# Patient Record
Sex: FEMALE | Race: WHITE
[De-identification: names, ages, dates, MRNs, and addresses within clinical notes are randomized per-mention and may not be internally consistent; named-entity substitution may affect disease eponyms.]

---

## 2017-03-24 ENCOUNTER — HOSPITAL ENCOUNTER (OUTPATIENT)
Dept: HOSPITAL 38 - CC.SDS | Age: 82
End: 2017-03-24
Attending: FAMILY MEDICINE
Payer: MEDICARE

## 2017-03-24 VITALS — SYSTOLIC BLOOD PRESSURE: 131 MMHG | DIASTOLIC BLOOD PRESSURE: 60 MMHG

## 2017-03-24 DIAGNOSIS — E78.00: ICD-10-CM

## 2017-03-24 DIAGNOSIS — K21.9: ICD-10-CM

## 2017-03-24 DIAGNOSIS — Z90.710: ICD-10-CM

## 2017-03-24 DIAGNOSIS — Z72.0: ICD-10-CM

## 2017-03-24 DIAGNOSIS — Z79.01: ICD-10-CM

## 2017-03-24 DIAGNOSIS — Z12.11: Primary | ICD-10-CM

## 2017-03-24 DIAGNOSIS — Z88.8: ICD-10-CM

## 2017-03-24 DIAGNOSIS — E55.9: ICD-10-CM

## 2017-03-24 DIAGNOSIS — I10: ICD-10-CM

## 2017-03-24 DIAGNOSIS — Z79.82: ICD-10-CM

## 2017-03-24 DIAGNOSIS — Z96.649: ICD-10-CM

## 2017-03-24 DIAGNOSIS — Z98.890: ICD-10-CM

## 2017-03-24 DIAGNOSIS — Z90.49: ICD-10-CM

## 2017-03-24 DIAGNOSIS — E03.9: ICD-10-CM

## 2017-03-24 DIAGNOSIS — Z79.899: ICD-10-CM

## 2017-03-24 PROCEDURE — G0105 COLORECTAL SCRN; HI RISK IND: HCPCS

## 2017-03-24 PROCEDURE — 36415 COLL VENOUS BLD VENIPUNCTURE: CPT

## 2017-03-24 PROCEDURE — 85610 PROTHROMBIN TIME: CPT

## 2017-03-24 NOTE — OR
DATE OF OPERATION:  03/24/2017

 

PREOPERATIVE DIAGNOSIS:  FOLLOWUP POLYPS.

 

POSTOPERATIVE DIAGNOSIS:  FOLLOWUP POLYPS.

 

SURGEON:  Michel Chan MD

 

PROCEDURE:  FULL-LENGTH COLONOSCOPY.

 

ANESTHESIA:  CRNA due to advanced age.

 

COMPLICATIONS:  None.

 

SPECIMEN:  None.

 

FINDINGS:  Normal full length colonoscopy with marginal prep.

 

RECOMMENDATIONS:  No further routine scopes needed.

 

INDICATIONS:  The patient was scheduled by her primary physician, Dr. Chacorta Mcnally

for followup on prior scope with polyp removal 5 years ago.

 

DESCRIPTION OF PROCEDURE:  The patient was prepped and draped, placed in the

left lateral decubitus position.  A lubricated Olympus colonoscope was inserted

and easily advanced to the cecum.  Direct visualization of the ileocecal valve

and appendiceal orifice was accomplished.  The bowel prep was marginal.  There

was a lot of liquid stool throughout most of the colon, but visualization was

for the most part adequate.  Upon withdrawal throughout the entire length of the

colon, I could find no signs of polyps, mass, ulceration, or bleeding sites.  No

vascular abnormalities or significant diverticular disease.  Rectal vault

appeared benign.

Retroflexion of the scope in the rectum showed no anal lesions.  Air was then

suctioned.  The scope was removed without complication.

 

TERESA/UMESH

DD:  03/24/2017 09:35:34

DT:  03/24/2017 11:24:56

Job #:  [id number]

## 2018-06-01 ENCOUNTER — HOSPITAL ENCOUNTER (EMERGENCY)
Dept: HOSPITAL 38 - CC.ED | Age: 83
Discharge: HOME | End: 2018-06-01
Payer: MEDICARE

## 2018-06-01 VITALS — SYSTOLIC BLOOD PRESSURE: 134 MMHG | DIASTOLIC BLOOD PRESSURE: 85 MMHG

## 2018-06-01 DIAGNOSIS — Z88.6: ICD-10-CM

## 2018-06-01 DIAGNOSIS — Z88.1: ICD-10-CM

## 2018-06-01 DIAGNOSIS — Z88.2: ICD-10-CM
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
## 2018-06-01 DIAGNOSIS — Z79.82: ICD-10-CM

## 2018-06-01 DIAGNOSIS — Z79.899: ICD-10-CM

## 2018-06-01 DIAGNOSIS — S01.01XA: Primary | ICD-10-CM

## 2018-06-01 NOTE — EDM.PDOC
ED HPI GENERAL MEDICAL PROBLEM





- General


Chief Complaint: Head Injury


Stated Complaint: fall, head injury


Time Seen by Provider: 06/01/18 11:45


Source of Information: Reports: Patient, Family


History Limitations: Reports: No Limitations





- History of Present Illness


INITIAL COMMENTS - FREE TEXT/NARRATIVE: 





Thais is a pleasant 88 year old female who presents to the ED via private 

vehicle with c/o a fall and laceration to her posterior scalp. She reports she 

was going up the steps and missed a step. She reports she fell backwards and 

her head hit a brick. She denies LOC. She reports her scalp started bleeding 

immediately, so she came to the ED. She reports a slight headache and some 

dizziness. Does have a large hematoma to her posterior head. She is on 

Coumadin. She reports she recently restarted it after being off of it for a 

proedure. She reports she has some right hip pain, but nothing worse than her 

baseline. She is able to ambulate without difficulty. Does not offer any 

additional complaints. 


Onset: Today, Sudden


Onset Date: 06/01/18


Onset Time: 11:00


Location: Reports: Head


Quality: Reports: Ache


Improves with: Reports: Cold Therapy, Other (pressure)


Associated Symptoms: Reports: Headaches.  Denies: Confusion, Chest Pain, Cough, 

cough w sputum, Diaphoresis, Fever/Chills, Loss of Appetite, Malaise, Nausea/

Vomiting, Rash, Shortness of Breath, Syncope, Weakness





- Related Data


 Allergies











Allergy/AdvReac Type Severity Reaction Status Date / Time


 


acetaminophen Allergy Intermediate Hallucinati Verified 06/01/18 13:23





[From Darvocet-N]   ons  


 


sulfamethoxazole Allergy Intermediate Cannot Verified 06/01/18 13:23





[From Bactrim]   Remember  


 


propoxyphene napsylate Allergy  Hallucinati Verified 06/01/18 13:23





[From Darvocet-N]   ons  


 


trimethoprim [From Bactrim] Allergy  Cannot Verified 06/01/18 13:23





   Remember  











Home Meds: 


 Home Meds





Aspirin [Adult Low Dose Aspirin EC] 81 mg PO BEDTIME 05/04/16 [History]


Levothyroxine 112 mcg PO ACBREAKFAST 05/04/16 [History]


Omeprazole 20 mg PO DAILY 05/04/16 [History]


Ranitidine [Zantac] 150 mg PO BEDTIME 05/04/16 [History]


Simvastatin [Zocor] 10 mg PO BEDTIME 05/04/16 [History]


Warfarin [Coumadin] 5 mg PO WE 05/04/16 [History]


amLODIPine [Norvasc] 10 mg PO DAILY 05/04/16 [History]


Biotin 5,000 mcg PO DAILY 03/22/17 [History]


Calcium Carb & Citrate/Vit D3 [Calcium + D3 ER Tablet] 1 tab PO TID 03/22/17 [

History]


Cholecalciferol (Vitamin D3) [Vitamin D3] 5,000 unit PO DAILY 03/22/17 [History]


Ferrous Sulfate [Iron] 1 tab PO DAILY 03/22/17 [History]


Furosemide 80 mg PO DAILY 03/22/17 [History]


Meclizine HCl 25 mg PO DAILY PRN 03/22/17 [History]


Multivitamin [Multi-Vitamin Daily] 1 tab PO DAILY 03/22/17 [History]


Potassium Chloride 20 meq PO DAILY 03/22/17 [History]


Triamterene/Hydrochlorothiazid [Triamterene-HCTZ 37.5-25 MG] 1 tab PO DAILY PRN 

03/22/17 [History]


Warfarin [Coumadin] 2.5 mg PO SUMOTUTHFRSA 06/01/18 [History]











Social & Family History





- Tobacco Use


Smoking Status *Q: Never Smoker





- Caffeine Use


Caffeine Use: Reports: None





ED ROS GENERAL





- Review of Systems


Review Of Systems: ROS reveals no pertinent complaints other than HPI.


Constitutional: Reports: No Symptoms


HEENT: Denies: Ear Discharge, Ear Pain, Vision Change


Respiratory: Reports: No Symptoms


Cardiovascular: Reports: No Symptoms


GI/Abdominal: Reports: No Symptoms


: Reports: No Symptoms


Musculoskeletal: Reports: Leg Pain, Joint Pain (hips)


Skin: Reports: Bruising (right leg, scalp)


Neurological: Reports: Dizziness, Headache.  Denies: Confusion, Numbness, 

Syncope, Tingling, Difficulty Walking, Weakness


Psychiatric: Reports: No Symptoms


Hematologic/Lymphatic: Reports: No Symptoms


Immunologic: Reports: No Symptoms





ED EXAM, HEAD INJURY





- Physical Exam


Exam: See Below


Exam Limited By: No Limitations


General Appearance: Alert, WD/WN, No Apparent Distress


Head: Normocephalic, Scalp Lacerations (right posterior ), Scalp Swelling (

right posterior), Scalp Hematoma (right posterior)


Eyes: Bilateral Eye: EOMI, Normal Fundi, Normal Inspection, PERRL


Ears: Normal External Exam, Normal Canal, Hearing Grossly Normal, Normal TMs


Nose: Normal Inspection, Normal Mucousa, No Blood


Throat/Mouth: Normal Inspection, Normal Lips, Normal Teeth, Normal Gums, Normal 

Oropharynx, Normal Voice, No Airway Compromise


Neck: Non-Tender, Full Range of Motion, Normal Alignment, Normal Inspection


Respiratory: No Respiratory Distress, Lungs Clear, Normal Breath Sounds, No 

Accessory Muscle Use, Chest Non-Tender


Cardiovascular: Normal Peripheral Pulses, Regular Rate, Rhythm, No Edema, No 

Gallop, No JVD, No Murmur, No Rub


Back Exam: Full Range of Motion, Normal Inspection, NT


Extremities: Normal Range of Motion, Non-Tender, Normal Capillary Refill, Leg 

Pain.  No: Joint Swelling


Neurologic: CNs II-XII nml As Tested, No Motor/Sensory Deficits, Alert, Normal 

Mood/Affect, Oriented x 3





- [place] Coma Score


Best Eye Response ([place]): (4) Open Spontaneously


Best Verbal Response ([place]): (5) Oriented


Best Motor Response ([place]): (6) Obeys Commands





Course





- Vital Signs


Last Recorded V/S: 


 Last Vital Signs











Temp  96.8 F   06/01/18 11:19


 


Pulse  86   06/01/18 11:19


 


Resp  16   06/01/18 11:19


 


BP  134/85   06/01/18 11:19


 


Pulse Ox  94 L  06/01/18 11:19














- Orders/Labs/Meds


Orders: 


 Active Orders 24 hr











 Category Date Time Status


 


 Head wo Cont [CT] Stat Exams  06/01/18 11:26 Taken











Labs: 


 Laboratory Tests











  06/01/18 Range/Units





  12:48 


 


PT  13.3 H  (9.7-12.3)  SEC


 


INR  1.30 H  (0.92-1.18)  














- Radiology Interpretation


Free Text/Narrative:: 





Negative for acute issues. 


CT Results Date: 06/01/18





- Re-Assessments/Exams


Free Text/Narrative Re-Assessment/Exam: 


Wound cleansed by nursing staff. Superficial abrasion approximately ~1.5 cm to 

right posterior scalp. Pinpoint area of bleeding. Hair shaved around abrasion. 

Steri strips applied to control bleeding. 





Departure





- Departure


Time of Disposition: 13:44


Disposition: Home, Self-Care 01


Condition: Good


Clinical Impression: 


Fall


Qualifiers:


 Encounter type: initial encounter Qualified Code(s): W19.XXXA - Unspecified 

fall, initial encounter





Scalp laceration


Qualifiers:


 Encounter type: initial encounter Qualified Code(s): S01.01XA - Laceration 

without foreign body of scalp, initial encounter








- Discharge Information


Instructions:  Facial or Scalp Contusion, Easy-to-Read, Laceration Care, Adult, 

Easy-to-Read


Referrals: 


Chacorta Mcnally MD [Primary Care Provider] - 


Forms:  ED Department Discharge


Additional Instructions: 


Head CT negative


Steri strips will gradually fall off. May shower as normal tomorrow. 


Tylenol as needed for pain


Apply ice to areas of pain


May also alternate heat as needed for comfort


Rest the next few days until stiffness improves


Follow up with Dr. Mcnally





- My Orders


Last 24 Hours: 


My Active Orders





06/01/18 11:26


Head wo Cont [CT] Stat 














- Assessment/Plan


Last 24 Hours: 


My Active Orders





06/01/18 11:26


Head wo Cont [CT] Stat

## 2018-10-03 ENCOUNTER — HOSPITAL ENCOUNTER (OUTPATIENT)
Dept: HOSPITAL 38 - CC.MS | Age: 83
Setting detail: OBSERVATION
LOS: 2 days | Discharge: HOME | End: 2018-10-05
Attending: FAMILY MEDICINE | Admitting: NURSE PRACTITIONER
Payer: MEDICARE

## 2018-10-03 DIAGNOSIS — Z88.5: ICD-10-CM

## 2018-10-03 DIAGNOSIS — Z79.82: ICD-10-CM

## 2018-10-03 DIAGNOSIS — I10: ICD-10-CM

## 2018-10-03 DIAGNOSIS — Z88.1: ICD-10-CM

## 2018-10-03 DIAGNOSIS — K64.8: Primary | ICD-10-CM

## 2018-10-03 DIAGNOSIS — Z79.899: ICD-10-CM

## 2018-10-03 LAB
CHLORIDE SERPL-SCNC: 106 MEQ/L (ref 98–106)
SODIUM SERPL-SCNC: 140 MEQ/L (ref 136–145)

## 2018-10-03 PROCEDURE — C9113 INJ PANTOPRAZOLE SODIUM, VIA: HCPCS

## 2018-10-03 PROCEDURE — G0378 HOSPITAL OBSERVATION PER HR: HCPCS

## 2018-10-04 LAB
CHLORIDE SERPL-SCNC: 111 MEQ/L (ref 98–106)
SODIUM SERPL-SCNC: 143 MEQ/L (ref 136–145)

## 2018-10-04 RX ADMIN — TRIAMTERENE AND HYDROCHLOROTHIAZIDE SCH EACH: 25; 37.5 CAPSULE ORAL at 09:34

## 2018-10-04 RX ADMIN — LEVOTHYROXINE SODIUM SCH MCG: 0.11 TABLET ORAL at 06:37

## 2018-10-04 RX ADMIN — AMLODIPINE BESYLATE SCH MG: 10 TABLET ORAL at 09:33

## 2018-10-04 NOTE — PCM.PN
- General Info


Date of Service: 10/04/18


Admission Dx/Problem (Free Text): 





GI Bleed


Functional Status: Reports: Pain Controlled, Tolerating Diet.  Denies: 

Ambulating





- Review of Systems


General: Reports: Fatigue.  Denies: Weakness


HEENT: Reports: No Symptoms


Pulmonary: Denies: Shortness of Breath, Cough


Cardiovascular: Denies: Chest Pain, Edema, Lightheadedness


Gastrointestinal: Reports: Abdominal Pain, Hematochezia.  Denies: Nausea, 

Vomiting


Genitourinary: Reports: No Symptoms


Musculoskeletal: Reports: No Symptoms


Skin: Reports: No Symptoms


Neurological: Reports: No Symptoms





- Patient Data


Vitals - Most Recent: 


 Last Vital Signs











Temp  98.2 F   10/04/18 19:26


 


Pulse  72   10/04/18 19:26


 


Resp  18   10/04/18 19:26


 


BP  130/63   10/04/18 19:26


 


Pulse Ox  94 L  10/04/18 19:26











Weight - Most Recent: 191 lb 11.2 oz


I&O - Last 24 Hours: 


 Intake & Output











 10/04/18 10/04/18 10/04/18





 06:59 14:59 22:59


 


Intake Total 981 879 


 


Balance 981 879 











Lab Results Last 24 Hours: 


 Laboratory Results - last 24 hr











  10/04/18 10/04/18 Range/Units





  08:50 08:50 


 


WBC   5.7  (5.0-10.0)  10^3/uL


 


RBC   3.93 L  (4.00-5.50)  10^6/uL


 


Hgb   12.0  (12.0-16.0)  g/dL


 


Hct   36.2 L  (37.0-47.0)  %


 


MCV   92.1  (82.0-94.0)  fL


 


MCH   30.5  (27.0-32.0)  pg


 


MCHC   33.1  (33.0-38.0)  g/dL


 


RDW Coeff of Porsha   13.4  (11.0-15.0)  %


 


Plt Count   199  (150-400)  10^3/uL


 


Neut % (Auto)   62.8  (35-85)  %


 


Lymph % (Auto)   22.0  (10-55)  %


 


Mono % (Auto)   8.6  (0-16)  %


 


Eos % (Auto)   5.9 H  (0-5)  %


 


Baso % (Auto)   0.7  (0-3)  %


 


Neut # (Auto)   3.60  (1.80-7.00)  10^3/uL


 


Lymph # (Auto)   1.26  (1.00-4.80)  10^3/uL


 


Mono # (Auto)   0.49  (0.00-0.80)  10^3/uL


 


Eos # (Auto)   0.34  (0.00-0.45)  10^3/uL


 


Baso # (Auto)   0.04  10^3/uL


 


Sodium  143   (136-145)  mEq/L


 


Potassium  3.3 L   (3.5-5.0)  mEq/L


 


Chloride  111 H   ()  mEq/L


 


Carbon Dioxide  26   (21-32)  mmol/L


 


BUN  9   (7-18)  mg/dL


 


Creatinine  0.8   (0.6-1.0)  mg/dL


 


Est Cr Clr Drug Dosing  42.90   mL/min


 


Estimated GFR (MDRD)  > 60   (>=60)  mL/min


 


Glucose  101 H   (75-99)  mg/dL


 


Calcium  8.1 L   (8.4-10.1)  mg/dL


 


C-Reactive Protein  < 0.2 L   (0.2-0.8)  mg/dL











Med Orders - Current: 


 Current Medications





Acetaminophen (Tylenol)  650 mg PO Q4H PRN


   PRN Reason: Pain (Mild 1-3)/fever


Amlodipine Besylate (Norvasc)  10 mg PO DAILY [facility]


   Last Admin: 10/04/18 09:33 Dose:  10 mg


Hydrocortisone Acetate (Anucort-Hc)  25 mg RECTAL BID [facility]


   Last Admin: 10/04/18 20:14 Dose:  25 mg


Levothyroxine Sodium (Levothyroxine)  112 mcg PO ACBREAKFAST [facility]


   Last Admin: 10/04/18 06:37 Dose:  112 mcg


Meclizine HCl (Antivert)  25 mg PO DAILY PRN


   PRN Reason: Dizziness


Pantoprazole Sodium (Protonix Iv***)  40 mg IVPUSH Q24H [facility]


   Last Admin: 10/04/18 10:31 Dose:  40 mg


Potassium Chloride 20 Meq Er Tab *Pt Own Med*  20 each PO DAILY [facility]


   Last Admin: 10/04/18 09:33 Dose:  20 each


Triamterene/HCTZ (Dyazide 25-37.5 Mg)  1 each PO DAILY [facility]


   Last Admin: 10/04/18 09:34 Dose:  1 each





Discontinued Medications





Fentanyl (Sublimaze) Confirm Administered Dose 100 mcg .ROUTE .STK-MED ONE


   Stop: 10/04/18 07:49


   Last Admin: 10/04/18 07:48 Dose:  Not Given


Sodium Chloride (Normal Saline)  1,000 mls @ 125 mls/hr IV ASDIRECTED [facility]


   Last Admin: 10/04/18 10:35 Dose:  125 mls/hr


Midazolam HCl (Versed 1 Mg/Ml) Confirm Administered Dose 4 mg .ROUTE .STK-MED 

ONE


   Stop: 10/04/18 07:49


   Last Admin: 10/04/18 07:48 Dose:  4 mg


Midazolam HCl (Versed 1 Mg/Ml)  4 mg IV .STK-MED ONE


   Stop: 10/04/18 08:06


   Last Admin: 10/04/18 08:05 Dose:  4 mg











- Exam


General: Alert, Oriented


HEENT: Mucous Membr. Moist/Pink


Neck: Supple


Lungs: Clear to Auscultation, Normal Respiratory Effort


Cardiovascular: Regular Rate, Regular Rhythm, Murmurs


GI/Abdominal Exam: Normal Bowel Sounds, Soft, Non-Tender


Extremities: Normal Inspection, Pedal Edema (1+ pitting)


Skin: Warm, Dry


Neurological: No New Focal Deficit





- Problem List & Annotations


(1) GI bleed


SNOMED Code(s): 22438103


   Code(s): K92.2 - GASTROINTESTINAL HEMORRHAGE, UNSPECIFIED   Status: Acute   

Priority: High   Current Visit: Yes   





- Problem List Review


Problem List Initiated/Reviewed/Updated: Yes





- My Orders


Last 24 Hours: 


My Active Orders





10/04/18 08:45


Hydrocortisone Acetate [Anucort-HC]   25 mg RECTAL BID 





10/04/18 Lunch


2 Gram Sodium Diet [DIET] 














- Assessment


Assessment:: 





GI Bleed





- Plan


Plan:: 





Patient states is feeling better now.  Had some rectal burning after the enema 

but that has now resolved.  Had a flex sig this am which did show a large 

internal hemorrhoid, currently not bleeding.  Dr. Mari was able to see 

throughout the colon, no other areas of concern were noted.   Labs noted, 

hemoglobin stable at 12.  Potassium low at 3.3.  CRP negative.  





Will start the patient on Anusol HC suppositories.  Increase diet and see how 

she tolerates.  Repeat labs in am.

## 2018-10-04 NOTE — OR
DATE OF OPERATION:  10/04/2018

 

PREOPERATIVE DIAGNOSIS:  RECTAL BLEEDING.

 

POSTOPERATIVE DIAGNOSIS:  RECTAL BLEEDING.

 

SURGEON:  Johnny Mari MD

 

PROCEDURE:  TOTAL COLONOSCOPY.

 

ANESTHESIA:  Conscious sedation with IV Versed.

 

SPECIMEN:  None.

 

FINDINGS:  Internal hemorrhoid most likely cause of her bright red rectal

bleeding.

 

RECOMMENDATIONS:  This probably does not need to be operated on and she would be

self limiting.

 

INDICATIONS:  This 89-year-old female has had several episodes of what she

reports is a large amount of rectal bleeding.  This is bright red in nature.

She has some known external hemorrhoids.  Her colonoscopy a year ago was normal.

 

DESCRIPTION OF PROCEDURE:  After adequate preparation with IV Versed and a preop

enema, I advanced the colonoscope into the rectum.  Examination showed an

internal hemorrhoid that had a moderate amount of roughage area on it.  It does

not appear to be actively bleeding.  I could get the scope up to the splenic

flexure without any difficulty and from there I still was able to advance the

scope along the transverse colon all the way to the ileocecal valve.  A

photograph of the bowel was taken.  She did have a moderate amount of stool, but

grossly I would have seen anything large representing a bleed from a tumor.  The

stool was within the transverse colon and did not have any blood mixed in with

it.  I did not see any active bleeding sites.  She does not have any significant

sigmoid diverticulosis and the rest of the

rectal examination is normal.  Air was suctioned from the colon and the scope

removed.

 

JIN/UMESH

DD:  10/04/2018 08:27:01

DT:  10/04/2018 08:55:56

Job #:  [id number]

## 2018-10-05 VITALS — DIASTOLIC BLOOD PRESSURE: 60 MMHG | SYSTOLIC BLOOD PRESSURE: 131 MMHG

## 2018-10-05 RX ADMIN — TRIAMTERENE AND HYDROCHLOROTHIAZIDE SCH EACH: 25; 37.5 CAPSULE ORAL at 07:59

## 2018-10-05 RX ADMIN — LEVOTHYROXINE SODIUM SCH MCG: 0.11 TABLET ORAL at 06:44

## 2018-10-05 RX ADMIN — AMLODIPINE BESYLATE SCH MG: 10 TABLET ORAL at 07:59

## 2018-10-07 NOTE — PCM.DCSUM1
**Discharge Summary





- Hospital Course


Free Text/Narrative:: 





Patient presented to clinic to see Marixa for rectal bleeding.  She had noted 

the evening prior that she had burgundy stools.  Had noted it several times 

before presentation to the clinic.  Melvin bleeding had stopped 1 hour prior to 

evaluation.  By exam, had gross red blood per rectum.  Had felt mildly 

lightheaded.  She had known hemorrhoids.  Colonoscopy done in 2017 with polyp 

removal.  Admitted for serial enzymes.  Started on Protonix.  


Diagnosis: Stroke: No


Modified [place] Scale: No Symptoms at All


Modified [place] Scale Score: 0





- Discharge Data


Discharge Date: 10/05/18


Discharge Disposition: Home, Self-Care 01


Condition: Good





- Discharge Diagnosis/Problem(s)


(1) GI bleed


SNOMED Code(s): 42146328


   ICD Code: K92.2 - GASTROINTESTINAL HEMORRHAGE, UNSPECIFIED   Status: Acute   

Priority: High   





- Patient Summary/Data


Complications: none


Consults: 


 Consultations





10/03/18 10:39


PT Evaluation and Treatment [CONS] Routine 





10/03/18 19:59


Consult to Physician [CONS] Urgent 











Hospital Course: 


Patient with rectal bleeding.  Had one positive stool on night of admission.  

Dr. Mari did do flex sig on patient with notable internal hemorrhoid noted.

  Not bleeding at time of scope.  Dr. Mari able to see through much of 

large colon without other notable areas of bleeding.  Hemoglobins stable.  

Hemoglobin at 14 on admit day, still 12 today with no further bleeding.  Anusol 

HC suppositories started yesterday and patient tolerating well.  Appetite has 

been good, no nausea or abdominal pain.  No rectal pain.  Up and ambulating.  





- Patient Instructions


Diet: Usual Diet as Tolerated


Activity: As Tolerated





- Discharge Plan


*PRESCRIPTION DRUG MONITORING PROGRAM REVIEWED*: No


*COPY OF PRESCRIPTION DRUG MONITORING REPORT IN PATIENT TUAN: No


Prescriptions/Med Rec: 


Hydrocortisone Acetate [Anucort-HC] 25 mg RECTAL BID #10 supp


Polyethylene Glycol 3350 [MiraLAX] 17 gm PO DAILY #30 packet


Home Medications: 


 Home Meds





Levothyroxine 112 mcg PO ACBREAKFAST 05/04/16 [History]


Omeprazole 20 mg PO DAILY 05/04/16 [History]


Ranitidine [Zantac] 150 mg PO BEDTIME 05/04/16 [History]


amLODIPine [Norvasc] 10 mg PO DAILY 05/04/16 [History]


Biotin 5,000 mcg PO DAILY 03/22/17 [History]


Calcium Carb & Citrate/Vit D3 [Calcium + D3 ER Tablet] 1 tab PO TID 03/22/17 [

History]


Cholecalciferol (Vitamin D3) [Vitamin D3] 5,000 unit PO DAILY 03/22/17 [History]


Meclizine HCl 25 mg PO DAILY PRN 03/22/17 [History]


Multivitamin [Multi-Vitamin Daily] 1 tab PO DAILY 03/22/17 [History]


Potassium Chloride 20 meq PO DAILY 03/22/17 [History]


Triamterene/Hydrochlorothiazid [Triamterene-HCTZ 37.5-25 MG] 1 tab PO DAILY PRN 

03/22/17 [History]


Aspirin 325 mg PO BEDTIME 10/03/18 [History]


Hydrocortisone Acetate [Anucort-HC] 25 mg RECTAL BID #10 supp 10/05/18 [Rx]


Polyethylene Glycol 3350 [MiraLAX] 17 gm PO DAILY #30 packet 10/05/18 [Rx]








Patient Handouts:  Hemorrhoids


Referrals: 


Marixa Jaimes, NP [Primary Care Provider] -  (Follow up with Marixa in 

one week)





- Discharge Summary/Plan Comment


DC Time >30 min.: No


Discharge Summary/Plan Comment: 





Discharge home





Anusol HC suppositories BID for 7 days


Follow up with Marixa in 10 days





- General Info


Date of Service: 10/05/18


Admission Dx/Problem (Free Text: 





GI Bleed


Functional Status: Reports: Pain Controlled, Tolerating Diet, Ambulating





- Review of Systems


General: Denies: Fever, Weakness, Fatigue


HEENT: Reports: No Symptoms


Pulmonary: Denies: Shortness of Breath, Cough


Cardiovascular: Denies: Chest Pain, Edema, Lightheadedness


Gastrointestinal: Reports: Hematochezia (has resolved).  Denies: Abdominal Pain

, Decreased Appetite, Nausea, Vomiting


Genitourinary: Reports: No Symptoms


Musculoskeletal: Reports: No Symptoms


Skin: Reports: No Symptoms


Neurological: Reports: No Symptoms





- Patient Data


Vitals - Most Recent: 


 Last Vital Signs











Temp  98.2 F   10/05/18 07:06


 


Pulse  57 L  10/05/18 07:06


 


Resp  20   10/05/18 07:06


 


BP  131/60   10/05/18 07:59


 


Pulse Ox  95   10/05/18 07:06











Weight - Most Recent: 191 lb 11.2 oz


Med Orders - Current: 


 Current Medications








Discontinued Medications





Acetaminophen (Tylenol)  650 mg PO Q4H PRN


   PRN Reason: Pain (Mild 1-3)/fever


Amlodipine Besylate (Norvasc)  10 mg PO DAILY [facility]


   Last Admin: 10/05/18 07:59 Dose:  10 mg


Fentanyl (Sublimaze) Confirm Administered Dose 100 mcg .ROUTE .STK-MED ONE


   Stop: 10/04/18 07:49


   Last Admin: 10/04/18 07:48 Dose:  Not Given


Hydrocortisone Acetate (Anucort-Hc)  25 mg RECTAL BID [facility]


   Last Admin: 10/05/18 07:59 Dose:  25 mg


Sodium Chloride (Normal Saline)  1,000 mls @ 125 mls/hr IV ASDIRECTED [facility]


   Last Admin: 10/04/18 10:35 Dose:  125 mls/hr


Levothyroxine Sodium (Levothyroxine)  112 mcg PO ACBREAKFAST [facility]


   Last Admin: 10/05/18 06:44 Dose:  112 mcg


Meclizine HCl (Antivert)  25 mg PO DAILY PRN


   PRN Reason: Dizziness


Midazolam HCl (Versed 1 Mg/Ml) Confirm Administered Dose 4 mg .ROUTE .STK-MED 

ONE


   Stop: 10/04/18 07:49


   Last Admin: 10/04/18 07:48 Dose:  4 mg


Midazolam HCl (Versed 1 Mg/Ml)  4 mg IV .STK-MED ONE


   Stop: 10/04/18 08:06


   Last Admin: 10/04/18 08:05 Dose:  4 mg


Pantoprazole Sodium (Protonix Iv***)  40 mg IVPUSH Q24H [facility]


   Last Admin: 10/04/18 10:31 Dose:  40 mg


Potassium Chloride 20 Meq Er Tab *Pt Own Med*  20 each PO DAILY [facility]


   Last Admin: 10/05/18 08:00 Dose:  20 each


Triamterene/HCTZ (Dyazide 25-37.5 Mg)  1 each PO DAILY [facility]


   Last Admin: 10/05/18 07:59 Dose:  1 each











- Exam


General: Reports: Alert, Oriented


HEENT: Reports: Mucous Membr. Moist/Pink


Neck: Reports: Supple


Lungs: Reports: Clear to Auscultation, Normal Respiratory Effort


Cardiovascular: Reports: Regular Rate, Regular Rhythm


GI/Abdominal Exam: Normal Bowel Sounds, Soft, Non-Tender


Extremities: Normal Inspection, No Pedal Edema


Skin: Reports: Warm, Dry


Neurological: Reports: No New Focal Deficit